# Patient Record
(demographics unavailable — no encounter records)

---

## 2025-01-21 NOTE — ASSESSMENT
[FreeTextEntry1] : 5-year-old male here coming by his parents presents today ration status post left foot fracture.  Patient's mother reports he was sledding 2 days ago when he fell off of his sled.  He was seen at urgent care where x-rays were taken which confirmed transverse fracture of the left fourth metatarsal neck with medial displacement of the distal fracture fragment. There is also a nondisplaced fracture of the left fifth metatarsal neck with associated cortical buckling. The joint spaces are maintained. There is a mild overlying soft tissue swelling.  He was placed in a splint and advised follow-up orthopedic specialist.  He has been utilizing a knee scooter to ambulate.  Physical examination left foot: Moderate swelling and ecchymosis appreciated distally and laterally.  No erythema is appreciated.  Skin is intact.  No signs of infection.  Palpation over the fourth and fifth metatarsal head/necks.  No tenderness of the other metatarsals or Lisfranc.  No tenderness of ankle bones.  No tenderness of the phalanges.  Full range of motion of the foot and ankle.  No instability.  Good strength wrap.  Sensation is intact distally.  Neuro vas intact.  Antalgic gait.   X-rays of left foot are reviewed from the hospital system and reveal a transverse fracture of the left fourth metatarsal neck with medial displacement of the distal fracture fragment. There is also a nondisplaced fracture of the left fifth metatarsal neck with associated cortical buckling. The joint spaces are maintained. There is a mild overlying soft tissue swelling.  TX: The patient has an acute closed displaced fracture of the fourth metacarpal head/neck along with a nondisplaced buckle fracture of the fifth metatarsal neck of the left foot.  Placed the patient in a short cam walker boot in the office today.  Advised patient that he should remain nonweightbearing to the best of ability at this time.  She understands these fractures take a total of 4 to 6 weeks to fully heal.  The patient already owns a knee scooter that which she will use for assistance in the nonweightbearing process at this time.  I recommended anti-inflammatory medication. Patient agrees to taking Advil/Ibuprofen OTC as needed for pain. Benefits discussed. Confirmed no contraindication to NSAIDs.  I recommended patient rest, ice, compress, and elevate the foot regularly. Encouraged activity modification as tolerable. Encouraged gentle range of motion to avoid stiffness.   All questions and concerns addressed to patient's satisfaction. Patient expresses full understanding of treatment plan. Give the patient follow-up in 2 weeks with her pediatric surgeon for repeat x-rays and further evaluation/treatment.

## 2025-02-04 NOTE — HISTORY OF PRESENT ILLNESS
[FreeTextEntry1] : 6 y/o male with fractures of fourth and fifth metatarsal bones of left foot after falling off a sled on 1/19/25.  He had x-rays done at urgent care. Has been wearing boot.

## 2025-02-04 NOTE — ASSESSMENT
[FreeTextEntry1] : x-rays today show slight healing no gym/no sports continue wearing boot  follow up in 3 weeks for repeat x-rays

## 2025-02-04 NOTE — PHYSICAL EXAM
[Not Examined] : not examined [Normal] : The patient is moving all extremities spontaneously without any gross neurologic deficits. They walk with a fluid nonantalgic gait. There are equal and symmetric deep tendon reflexes in the upper and lower extremities bilaterally. There is gross intact sensation to soft and light touch in the bilateral upper and lower extremities [de-identified] :  ISLT Intact Motor

## 2025-02-04 NOTE — HISTORY OF PRESENT ILLNESS
[FreeTextEntry1] : 4 y/o male with fractures of fourth and fifth metatarsal bones of left foot after falling off a sled on 1/19/25.  He had x-rays done at urgent care. Has been wearing boot.

## 2025-02-04 NOTE — PHYSICAL EXAM
[Not Examined] : not examined [Normal] : The patient is moving all extremities spontaneously without any gross neurologic deficits. They walk with a fluid nonantalgic gait. There are equal and symmetric deep tendon reflexes in the upper and lower extremities bilaterally. There is gross intact sensation to soft and light touch in the bilateral upper and lower extremities [de-identified] :  ISLT Intact Motor

## 2025-02-24 NOTE — PHYSICAL EXAM
[Not Examined] : not examined [Normal] : The patient is moving all extremities spontaneously without any gross neurologic deficits. They walk with a fluid nonantalgic gait. There are equal and symmetric deep tendon reflexes in the upper and lower extremities bilaterally. There is gross intact sensation to soft and light touch in the bilateral upper and lower extremities [de-identified] :  ISLT Intact Motor

## 2025-02-24 NOTE — HISTORY OF PRESENT ILLNESS
[FreeTextEntry1] : 6 y/o male with fractures of fourth and fifth metatarsal bones of left foot after falling off a sled on 1/19/25. He had x-rays done at urgent care. Has been wearing a boot since two weeks ago at his last visit.

## 2025-02-24 NOTE — HISTORY OF PRESENT ILLNESS
[FreeTextEntry1] : 4 y/o male with fractures of fourth and fifth metatarsal bones of left foot after falling off a sled on 1/19/25. He had x-rays done at urgent care. Has been wearing a boot since two weeks ago at his last visit.

## 2025-02-24 NOTE — ASSESSMENT
[FreeTextEntry1] : X-rays show good healing no gym/sports for 2 weeks can return to normal shoes follow up in 4 months for growth plate check